# Patient Record
(demographics unavailable — no encounter records)

---

## 2025-06-23 NOTE — ASSESSMENT
[FreeTextEntry1] : ADHD We will continue Vyvanse 50 mg a day Doing well on that  Follow-up in 6 months and by phone prior to that if needed.

## 2025-06-23 NOTE — HISTORY OF PRESENT ILLNESS
[FreeTextEntry1] : This 28-year-old woman carries a diagnosis of ADHD.  Says she was diagnosed at age 7 by pediatric neurology.  She has been followed in the [facility] system and prior records have been reviewed.  Was doing well on Concerta 54 mg a day.  That became unavailable for a period of time and she was switched to Vyvanse, currently 50 mg a day doing well.    Now working as an   Her medical history is otherwise unremarkable.